# Patient Record
Sex: MALE | Race: BLACK OR AFRICAN AMERICAN | NOT HISPANIC OR LATINO | URBAN - METROPOLITAN AREA
[De-identification: names, ages, dates, MRNs, and addresses within clinical notes are randomized per-mention and may not be internally consistent; named-entity substitution may affect disease eponyms.]

---

## 2021-01-09 ENCOUNTER — EMERGENCY (EMERGENCY)
Facility: HOSPITAL | Age: 31
LOS: 1 days | Discharge: HOME | End: 2021-01-09
Admitting: EMERGENCY MEDICINE
Payer: COMMERCIAL

## 2021-01-09 VITALS
TEMPERATURE: 98 F | OXYGEN SATURATION: 100 % | SYSTOLIC BLOOD PRESSURE: 130 MMHG | DIASTOLIC BLOOD PRESSURE: 87 MMHG | RESPIRATION RATE: 16 BRPM | HEART RATE: 95 BPM

## 2021-01-09 PROCEDURE — 99283 EMERGENCY DEPT VISIT LOW MDM: CPT

## 2021-01-09 RX ORDER — OXYCODONE AND ACETAMINOPHEN 5; 325 MG/1; MG/1
1 TABLET ORAL ONCE
Refills: 0 | Status: DISCONTINUED | OUTPATIENT
Start: 2021-01-09 | End: 2021-01-09

## 2021-01-09 RX ORDER — KETOROLAC TROMETHAMINE 30 MG/ML
30 SYRINGE (ML) INJECTION ONCE
Refills: 0 | Status: DISCONTINUED | OUTPATIENT
Start: 2021-01-09 | End: 2021-01-09

## 2021-01-09 RX ORDER — IBUPROFEN 200 MG
1 TABLET ORAL
Qty: 15 | Refills: 0
Start: 2021-01-09 | End: 2021-01-13

## 2021-01-09 RX ADMIN — Medication 1 TABLET(S): at 23:36

## 2021-01-09 RX ADMIN — Medication 30 MILLIGRAM(S): at 22:10

## 2021-01-09 RX ADMIN — OXYCODONE AND ACETAMINOPHEN 1 TABLET(S): 5; 325 TABLET ORAL at 22:10

## 2021-01-09 NOTE — ED PROVIDER NOTE - NSFOLLOWUPINSTRUCTIONS_ED_ALL_ED_FT
Advance activity as tolerated.  Continue all previously prescribed medications as directed unless otherwise instructed.  Follow up with your primary care physician in 48-72 hours- bring copies of your results.  Return to the ER for worsening or persistent symptoms, and/or ANY NEW OR CONCERNING SYMPTOMS. If you have issues obtaining follow up, please call: [phone] to obtain a doctor or specialist who takes your insurance in your area.  You may call [phone] to make an appointment with the internal medicine clinic.     Drain to be removed in 2 days, return to the ED for drain removal.

## 2021-01-09 NOTE — ED PROVIDER NOTE - PATIENT PORTAL LINK FT
You can access the FollowMyHealth Patient Portal offered by [facility] by registering at the following website: [email or web address]. By joining [employer]’s FollowMyHealth portal, you will also be able to view your health information using other applications (apps) compatible with our system.

## 2021-01-09 NOTE — PROGRESS NOTE ADULT - SUBJECTIVE AND OBJECTIVE BOX
CC: Patient presents with tooth pain in quad with associated facial and gingival swelling.    HPI: 32 y/o M with no PMHx presents with lower left dental pain which began 2 days ago. He was scheduled to have multiple extractions yesterday, but his oral surgeon rescheduled his appointment to the end of January. Patient reports difficulty sleeping and eating for the past 2 days. Now he has swelling over the area and pain. Patient reports punching wall due to frustration with tooth pain. Patient reports taking ibuprofen with some relief of symptoms. Patient also reports taking some of his girlfriend's amoxicillin today 3x 500mg capsules. Patient denies fever, vomiting, or changes in vision.    Med HX: No pertinent past medical history    No pertinent past medical history    Dental infection    No significant past surgical history    No significant past surgical history    DENTAL PAIN    SysAdmin_VisitLink      Allergies: No Known Allergies      RX:    EOE: (+) mild swelling in the lower left mandible, sensitive to palpation  TMJ (WNL)  Trismus (-)  LAD (-)  Dysphagia (-)    IOE: Permanent dentition. Multiple carious teeth. Multiple missing teeth. (+) swelling in the lower left vestibule adjacent to #18. (+) palpation.  Hard/Soft palate (WNL)  Tongue/Floor of Mouth (WNL)  Buccal Mucosa (WNL)  Percussion (+) #18  Mobility (-)     Radiographs: PA / PAN    Additional Radiograph: CT ordered by ED prior to dental on call arrival.    Assessment: Gross caries tooth #18 with associated swelling.    Treatment: Discussed radiographic and clinical findings with patient. Disccused RBA of recommended treatment. Obtained verbal consent. Applied 20% benzocaine. Administered 1 carpule of 2% lidocaine 1:100k epi via left inferior alveolar nerve block and 1 carpule 4% septocaine 1:100k epi via local infiltration with changing of needles after each administration. Incised to bone, dissected fascial planes, hemopurulence appreciated. Irrigated copiously with sterile saline. Penrose drain placed with 1 4-0 ethilon suture. Hemostasis achieved. POIG. Instructed patient discontinue taking his girlfriend's antibiotics. All questions answered.    Recommendations:   1. OTC pain medication as needed.  2. Per ED, complete course of antibiotics.  3. F/U in 2-3 business days for drain removal with either outpatient dentist or [facility] dental clinic [phone].  4. If any difficulty breathing/swallowing or fever and swelling occur, return to ED.    Dilan Way, DMD #[id number] CC: Patient presents with tooth pain in quad with associated facial and gingival swelling.    HPI: 32 y/o M with no PMHx presents with lower left dental pain which began 2 days ago. He was scheduled to have multiple extractions yesterday, but his oral surgeon rescheduled his appointment to the end of January. Patient reports difficulty sleeping and eating for the past 2 days. Now he has swelling over the area and pain. Patient reports punching wall due to frustration with tooth pain. Patient reports taking ibuprofen with some relief of symptoms. Patient also reports taking some of his girlfriend's amoxicillin today. Patient denies fever, vomiting, or changes in vision.    Med HX: No pertinent past medical history    No pertinent past medical history    Dental infection    No significant past surgical history    No significant past surgical history    DENTAL PAIN    SysAdmin_VisitLink      Allergies: No Known Allergies      RX:    EOE: (+) mild swelling in the lower left mandible, sensitive to palpation  TMJ (WNL)  Trismus (-)  LAD (-)  Dysphagia (-)    IOE: Permanent dentition. Multiple carious teeth. Multiple missing teeth. (+) swelling in the lower left vestibule adjacent to #18. (+) palpation.  Hard/Soft palate (WNL)  Tongue/Floor of Mouth (WNL)  Buccal Mucosa (WNL)  Percussion (+) #18  Mobility (-)     Radiographs: PA / PAN    Additional Radiograph: CT ordered by ED prior to dental on call arrival.    Assessment: Gross caries tooth #18 with associated swelling.    Treatment: Discussed radiographic and clinical findings with patient. Disccused RBA of recommended treatment. Obtained verbal consent. Applied 20% benzocaine. Administered 1 carpule of 2% lidocaine 1:100k epi via left inferior alveolar nerve block and 1 carpule 4% septocaine 1:100k epi via local infiltration with changing of needles after each administration. Incised to bone, dissected fascial planes, hemopurulence appreciated. Irrigated copiously with sterile saline. Penrose drain placed with 1 4-0 ethilon suture. Hemostasis achieved. POIG. Instructed patient discontinue taking his girlfriend's antibiotics. All questions answered.    Recommendations:   1. OTC pain medication as needed.  2. Per ED, complete course of antibiotics.  3. F/U in 2-3 business days for drain removal with either outpatient dentist or [facility] dental clinic [phone].  4. If any difficulty breathing/swallowing or fever and swelling occur, return to ED.    Jamel Roth DDS, #[id number] CC: Patient presents with tooth pain in quad with associated facial and gingival swelling.    HPI: 32 y/o M with no PMHx presents with lower left dental pain which began 2 days ago. He was scheduled to have multiple extractions yesterday, but his oral surgeon rescheduled his appointment to the end of January. Patient reports difficulty sleeping and eating for the past 2 days. Now he has swelling over the area and pain. Patient reports punching wall due to frustration with tooth pain. Patient reports taking ibuprofen with some relief of symptoms. Patient also reports taking some of his girlfriend's amoxicillin today. Patient denies fever, vomiting, or changes in vision.    Med HX: No pertinent past medical history    No pertinent past medical history    Dental infection    No significant past surgical history    No significant past surgical history    DENTAL PAIN    SysAdmin_VisitLink      Allergies: No Known Allergies      RX:    EOE: (+) mild swelling in the lower left mandible, sensitive to palpation  TMJ (WNL)  Trismus (-)  LAD (-)  Dysphagia (-)    IOE: Permanent dentition. Multiple carious teeth. Multiple missing teeth. (+) swelling in the lower left vestibule adjacent to #18. (+) palpation.  Hard/Soft palate (WNL)  Tongue/Floor of Mouth (WNL)  Buccal Mucosa (WNL)  Percussion (+) #18  Mobility (-)     Radiographs: PA / PAN    Additional Radiograph:    Assessment: Gross caries tooth #18 with associated swelling.    Treatment: Discussed radiographic and clinical findings with patient. Disccused RBA of recommended treatment. Obtained verbal consent. Applied 20% benzocaine. Administered 1 carpule of 2% lidocaine 1:100k epi via left inferior alveolar nerve block and 1 carpule 4% septocaine 1:100k epi via local infiltration with changing of needles after each administration. Incised to bone, dissected fascial planes, hemopurulence appreciated. Irrigated copiously with sterile saline. Penrose drain placed with 1 4-0 ethilon suture. Hemostasis achieved. POIG. Instructed patient discontinue taking his girlfriend's antibiotics. All questions answered.    Recommendations:   1. OTC pain medication as needed.  2. Per ED, complete course of antibiotics.  3. F/U in 2-3 business days for drain removal with either outpatient dentist or [facility] dental clinic [phone].  4. If any difficulty breathing/swallowing or fever and swelling occur, return to ED.    Jamel Roth DDS, #[id number]

## 2021-01-09 NOTE — ED PROVIDER NOTE - CLINICAL SUMMARY MEDICAL DECISION MAKING FREE TEXT BOX
30 y/o M presents to Ed with toothache infection. Will give pain control with possible drainage placement, and follow up with outpatient oral surgery.

## 2021-01-09 NOTE — ED PROVIDER NOTE - OBJECTIVE STATEMENT
32 y/o M with no PMHx presents with left upper dental pain as of a few days. He was advised by dentist to have a tooth extraction of the left upper molar, which was scheduled yesterday. His oral surgeon rescheduled his appointment to the end of January. Now he has swelling over the area and pain. No drooling, stridor, or chills.

## 2021-01-09 NOTE — ED PROVIDER NOTE - NSFOLLOWUPCLINICS_GEN_ALL_ED_FT
[facility]  Dental  [address] [place] [id number]  Phone: [phone]  Fax:   Follow Up Time:

## 2021-01-09 NOTE — ED ADULT TRIAGE NOTE - CHIEF COMPLAINT QUOTE
Pt c/o left sided wisdom teeth pain since Thursday. Pt states jaw is swollen and left side face hurts. Pt has been taking aleve with no relief. Pt wasn't able to get an appointment with dentist  till 1/29. pt noted to have abrasions on bilateral hands from punching the wall due to pain. No complaints of chest pain, headache, nausea, dizziness, vomiting  SOB, fever, chills verbalized..

## 2021-01-09 NOTE — ED PROVIDER NOTE - TOOTH FINDINGS
Multiple dental caries and fillings observed. Left upper gingival area shows swelling with cheek area. Rest of dental exam benign./DENTAL CARIES